# Patient Record
Sex: FEMALE | Race: WHITE
[De-identification: names, ages, dates, MRNs, and addresses within clinical notes are randomized per-mention and may not be internally consistent; named-entity substitution may affect disease eponyms.]

---

## 2019-02-27 ENCOUNTER — HOSPITAL ENCOUNTER (OUTPATIENT)
Dept: HOSPITAL 61 - KCIC MRI | Age: 59
Discharge: HOME | End: 2019-02-27
Attending: CHIROPRACTOR
Payer: COMMERCIAL

## 2019-02-27 DIAGNOSIS — M66.0: ICD-10-CM

## 2019-02-27 DIAGNOSIS — M17.11: Primary | ICD-10-CM

## 2019-02-27 DIAGNOSIS — M94.261: ICD-10-CM

## 2019-02-27 PROCEDURE — 73721 MRI JNT OF LWR EXTRE W/O DYE: CPT

## 2019-02-27 NOTE — [facility]
Examination: MRI of the right knee without contrast

 

HISTORY: History of right knee pain for 3 to 4 weeks

 

COMPARISON: None available 

 

Technique: Multiplanar, multisequence MR imaging of the right knee were 

performed without contrast.

 

 

 

FINDINGS:

 

 

The anterior cruciate ligament, posterior cruciate ligament appear intact.

 

There is blunting of the posterior horn of the medial meniscus, best 

visualized on series 13 image #12 could be a subtle tear. The lateral 

meniscus appears intact.

 

There is mild increased T2 signal identified about the medial collateral 

ligament. The visualized lateral collateral ligament, popliteus tendon, 

biceps femoris tendon appear intact.

 

Moderate knee joint effusion is identified. There is a moderate leaking 

popliteal cyst identified.

 

The medial, lateral retinaculum appear intact.

 

Mild increased T2 signal identified in the soft tissue about the knee. 

There is a mild joint space loss identified in the medial, lateral, 

patellofemoral compartments.

 

The extensor mechanism is intact.

 

There is a deep fissuring of cartilage identified in the patellofemoral 

compartment, medial, lateral compartments.

 

 

 

IMPRESSION:

 

 

1. Blunting of the posterior horn of the medial meniscus seen only on the 

coronal view, possibly a tear. Examination limited due to motion artifact.

 

2. Moderate joint effusion with moderate-sized leaking popliteal cyst.

 

3. Increased T2 signal identified about the medial collateral ligament 

could be mild sprain.

 

4. Increased T2 signal identified about the knee joint likely secondary to

edema.

 

5. Moderate tricompartmental degenerative changes with grade II 

chondromalacia medial, lateral, patellofemoral compartments.

 

Electronically signed by: Truong Wolfe MD (2/27/2019 10:25 AM) 

[facility]

## 2020-10-30 ENCOUNTER — HOSPITAL ENCOUNTER (EMERGENCY)
Dept: HOSPITAL 61 - ER | Age: 60
Discharge: HOME | End: 2020-10-30
Payer: COMMERCIAL

## 2020-10-30 VITALS — HEIGHT: 64 IN | WEIGHT: 290.79 LBS | BODY MASS INDEX: 49.64 KG/M2

## 2020-10-30 VITALS — DIASTOLIC BLOOD PRESSURE: 67 MMHG | SYSTOLIC BLOOD PRESSURE: 153 MMHG

## 2020-10-30 DIAGNOSIS — R07.81: ICD-10-CM

## 2020-10-30 DIAGNOSIS — R10.12: ICD-10-CM
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
## 2020-10-30 DIAGNOSIS — I10: ICD-10-CM
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
## 2020-10-30 DIAGNOSIS — E11.9: ICD-10-CM

## 2020-10-30 DIAGNOSIS — E66.9: ICD-10-CM

## 2020-10-30 DIAGNOSIS — S22.32XA: Primary | ICD-10-CM

## 2020-10-30 LAB
ALBUMIN SERPL-MCNC: 4.2 G/DL (ref 3.4–5)
ALBUMIN/GLOB SERPL: 1.2 {RATIO} (ref 1–1.7)
ALP SERPL-CCNC: 95 U/L (ref 46–116)
ALT SERPL-CCNC: 52 U/L (ref 14–59)
ANION GAP SERPL CALC-SCNC: 13 MMOL/L (ref 6–14)
APTT BLD: 29 SEC (ref 24–38)
AST SERPL-CCNC: 54 U/L (ref 15–37)
BASOPHILS # BLD AUTO: 0.1 X10^3/UL (ref 0–0.2)
BASOPHILS NFR BLD: 1 % (ref 0–3)
BILIRUB SERPL-MCNC: 0.5 MG/DL (ref 0.2–1)
BUN SERPL-MCNC: 18 MG/DL (ref 7–20)
BUN/CREAT SERPL: 18 (ref 6–20)
CALCIUM SERPL-MCNC: 9.9 MG/DL (ref 8.5–10.1)
CHLORIDE SERPL-SCNC: 102 MMOL/L (ref 98–107)
CO2 SERPL-SCNC: 26 MMOL/L (ref 21–32)
CREAT SERPL-MCNC: 1 MG/DL (ref 0.6–1)
EOSINOPHIL NFR BLD: 0.2 X10^3/UL (ref 0–0.7)
EOSINOPHIL NFR BLD: 2 % (ref 0–3)
ERYTHROCYTE [DISTWIDTH] IN BLOOD BY AUTOMATED COUNT: 14.3 % (ref 11.5–14.5)
GFR SERPLBLD BASED ON 1.73 SQ M-ARVRAT: 56.2 ML/MIN
GLUCOSE SERPL-MCNC: 156 MG/DL (ref 70–99)
HCT VFR BLD CALC: 44.7 % (ref 36–47)
HGB BLD-MCNC: 14.8 G/DL (ref 12–15.5)
LIPASE: 125 U/L (ref 73–393)
LYMPHOCYTES # BLD: 3.6 X10^3/UL (ref 1–4.8)
LYMPHOCYTES NFR BLD AUTO: 30 % (ref 24–48)
MAGNESIUM SERPL-MCNC: 1.8 MG/DL (ref 1.8–2.4)
MCH RBC QN AUTO: 30 PG (ref 25–35)
MCHC RBC AUTO-ENTMCNC: 33 G/DL (ref 31–37)
MCV RBC AUTO: 90 FL (ref 79–100)
MONO #: 0.7 X10^3/UL (ref 0–1.1)
MONOCYTES NFR BLD: 5 % (ref 0–9)
NEUT #: 7.6 X10^3/UL (ref 1.8–7.7)
NEUTROPHILS NFR BLD AUTO: 63 % (ref 31–73)
PLATELET # BLD AUTO: 236 X10^3/UL (ref 140–400)
POTASSIUM SERPL-SCNC: 4.4 MMOL/L (ref 3.5–5.1)
PROT SERPL-MCNC: 7.7 G/DL (ref 6.4–8.2)
PROTHROMBIN TIME: 12.8 SEC (ref 11.7–14)
RBC # BLD AUTO: 4.97 X10^6/UL (ref 3.5–5.4)
SODIUM SERPL-SCNC: 141 MMOL/L (ref 136–145)
WBC # BLD AUTO: 12.2 X10^3/UL (ref 4–11)

## 2020-10-30 PROCEDURE — 96361 HYDRATE IV INFUSION ADD-ON: CPT

## 2020-10-30 PROCEDURE — 74177 CT ABD & PELVIS W/CONTRAST: CPT

## 2020-10-30 PROCEDURE — 83735 ASSAY OF MAGNESIUM: CPT

## 2020-10-30 PROCEDURE — 72125 CT NECK SPINE W/O DYE: CPT

## 2020-10-30 PROCEDURE — 71260 CT THORAX DX C+: CPT

## 2020-10-30 PROCEDURE — 85730 THROMBOPLASTIN TIME PARTIAL: CPT

## 2020-10-30 PROCEDURE — 71045 X-RAY EXAM CHEST 1 VIEW: CPT

## 2020-10-30 PROCEDURE — 93005 ELECTROCARDIOGRAM TRACING: CPT

## 2020-10-30 PROCEDURE — 99285 EMERGENCY DEPT VISIT HI MDM: CPT

## 2020-10-30 PROCEDURE — 85610 PROTHROMBIN TIME: CPT

## 2020-10-30 PROCEDURE — 36415 COLL VENOUS BLD VENIPUNCTURE: CPT

## 2020-10-30 PROCEDURE — 70450 CT HEAD/BRAIN W/O DYE: CPT

## 2020-10-30 PROCEDURE — 80053 COMPREHEN METABOLIC PANEL: CPT

## 2020-10-30 PROCEDURE — 96375 TX/PRO/DX INJ NEW DRUG ADDON: CPT

## 2020-10-30 PROCEDURE — 96374 THER/PROPH/DIAG INJ IV PUSH: CPT

## 2020-10-30 PROCEDURE — 85025 COMPLETE CBC W/AUTO DIFF WBC: CPT

## 2020-10-30 PROCEDURE — 84484 ASSAY OF TROPONIN QUANT: CPT

## 2020-10-30 PROCEDURE — 73090 X-RAY EXAM OF FOREARM: CPT

## 2020-10-30 PROCEDURE — 83690 ASSAY OF LIPASE: CPT
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
## 2020-10-30 NOTE — RAD
Two-view right forearm

 

HISTORY: Pain

 

AP lateral views

 

The visualized osseous structures appear normal. There is soft tissue 

swelling posterior laterally.

 

IMPRESSION:

 

No acute bony abnormality identified.

 

Electronically signed by: Clyde Montalvo III, MD (10/30/2020 6:01 PM) 

[facility]
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
## 2020-10-30 NOTE — RAD
Exam: Chest one view

 

INDICATION: Short of air

 

TECHNIQUE: Frontal view of the chest

 

Comparisons: None

 

FINDINGS:

The cardiomediastinal silhouette and pulmonary vessels are within normal 

limits.

 

Strandy opacities the lung bases bilaterally. No pleural effusion. 

 

IMPRESSION:

Bibasilar atelectasis.

 

Electronically signed by: Virginia Paulino MD (10/30/2020 5:54 PM) [facility]
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
## 2020-10-31 NOTE — EKG
[facility]

              [address] [place] [address]

Test Date:    2020-10-30               Test Time:    17:38:39

Pat Name:     MARICRUZ HERNDON          Department:   

Patient ID:   [id number]           Room:          

Gender:       F                        Technician:   

:          [id number]               Requested By: FRIDA MAHMOOD

Order Number: [id number]           Reading MD:     

                                 Measurements

Intervals                              Axis          

Rate:         107                      P:            152

PA:           178                      QRS:          -64

QRSD:         154                      T:            68

QT:           370                                    

QTc:          493                                    

                           Interpretive Statements

SINUS TACHYCARDIA

ABNORMAL LEFT AXIS DEVIATION

NON SPECIFIC INTRAVENTRICULAR BLOCK

ABNORMAL ECG

RI6.01

No previous ECG available for comparison